# Patient Record
Sex: MALE | Race: WHITE | Employment: UNEMPLOYED | URBAN - NONMETROPOLITAN AREA
[De-identification: names, ages, dates, MRNs, and addresses within clinical notes are randomized per-mention and may not be internally consistent; named-entity substitution may affect disease eponyms.]

---

## 2022-03-24 ENCOUNTER — OFFICE VISIT (OUTPATIENT)
Dept: FAMILY MEDICINE CLINIC | Age: 37
End: 2022-03-24
Payer: MEDICAID

## 2022-03-24 VITALS
OXYGEN SATURATION: 97 % | WEIGHT: 137 LBS | RESPIRATION RATE: 18 BRPM | HEART RATE: 92 BPM | BODY MASS INDEX: 21.5 KG/M2 | HEIGHT: 67 IN | DIASTOLIC BLOOD PRESSURE: 80 MMHG | SYSTOLIC BLOOD PRESSURE: 136 MMHG | TEMPERATURE: 97.4 F

## 2022-03-24 DIAGNOSIS — M62.421 CONTRACTURE OF MUSCLE OF RIGHT UPPER ARM: ICD-10-CM

## 2022-03-24 DIAGNOSIS — F19.11 DRUG ABUSE IN REMISSION (HCC): ICD-10-CM

## 2022-03-24 DIAGNOSIS — Z86.73 HISTORY OF STROKE: ICD-10-CM

## 2022-03-24 DIAGNOSIS — F41.8 DEPRESSION WITH ANXIETY: ICD-10-CM

## 2022-03-24 DIAGNOSIS — E78.5 HYPERLIPIDEMIA, UNSPECIFIED HYPERLIPIDEMIA TYPE: ICD-10-CM

## 2022-03-24 DIAGNOSIS — I10 PRIMARY HYPERTENSION: Primary | ICD-10-CM

## 2022-03-24 PROCEDURE — G8427 DOCREV CUR MEDS BY ELIG CLIN: HCPCS | Performed by: NURSE PRACTITIONER

## 2022-03-24 PROCEDURE — G8484 FLU IMMUNIZE NO ADMIN: HCPCS | Performed by: NURSE PRACTITIONER

## 2022-03-24 PROCEDURE — G8420 CALC BMI NORM PARAMETERS: HCPCS | Performed by: NURSE PRACTITIONER

## 2022-03-24 PROCEDURE — 1036F TOBACCO NON-USER: CPT | Performed by: NURSE PRACTITIONER

## 2022-03-24 PROCEDURE — 99204 OFFICE O/P NEW MOD 45 MIN: CPT | Performed by: NURSE PRACTITIONER

## 2022-03-24 RX ORDER — FAMOTIDINE 20 MG/1
TABLET, FILM COATED ORAL
COMMUNITY
Start: 2022-03-08

## 2022-03-24 RX ORDER — DULOXETIN HYDROCHLORIDE 30 MG/1
30 CAPSULE, DELAYED RELEASE ORAL DAILY
Qty: 30 CAPSULE | Refills: 0 | Status: SHIPPED | OUTPATIENT
Start: 2022-03-24 | End: 2022-06-02

## 2022-03-24 RX ORDER — VELPATASVIR AND SOFOSBUVIR 100; 400 MG/1; MG/1
TABLET, FILM COATED ORAL
COMMUNITY
Start: 2022-03-08

## 2022-03-24 RX ORDER — METOPROLOL SUCCINATE 50 MG/1
TABLET, EXTENDED RELEASE ORAL
COMMUNITY
Start: 2022-01-25

## 2022-03-24 RX ORDER — ASPIRIN 81 MG/1
TABLET, CHEWABLE ORAL
COMMUNITY
Start: 2022-03-08 | End: 2022-07-27 | Stop reason: SDUPTHER

## 2022-03-24 RX ORDER — MIRTAZAPINE 7.5 MG/1
TABLET, FILM COATED ORAL
COMMUNITY
Start: 2022-03-08 | End: 2022-03-24

## 2022-03-24 RX ORDER — ATORVASTATIN CALCIUM 40 MG/1
TABLET, FILM COATED ORAL
COMMUNITY
Start: 2022-03-08 | End: 2022-07-27 | Stop reason: SDUPTHER

## 2022-03-24 RX ORDER — FLUTICASONE PROPIONATE 50 MCG
SPRAY, SUSPENSION (ML) NASAL
COMMUNITY
End: 2022-03-24

## 2022-03-24 RX ORDER — METHADONE HYDROCHLORIDE 5 MG/1
TABLET ORAL
COMMUNITY

## 2022-03-24 RX ORDER — ALBUTEROL SULFATE 90 UG/1
AEROSOL, METERED RESPIRATORY (INHALATION)
COMMUNITY
End: 2022-03-24

## 2022-03-24 RX ORDER — GABAPENTIN 800 MG/1
TABLET ORAL
COMMUNITY
End: 2022-03-24

## 2022-03-24 RX ORDER — MULTIVIT-MIN/IRON FUM/FOLIC AC 7.5 MG-4
1 TABLET ORAL DAILY
COMMUNITY
Start: 2022-03-08 | End: 2022-07-27 | Stop reason: SDUPTHER

## 2022-03-24 RX ORDER — MULTIVIT-MIN/IRON/FOLIC ACID/K 18-600-40
CAPSULE ORAL
COMMUNITY
Start: 2022-03-08 | End: 2022-07-27 | Stop reason: SDUPTHER

## 2022-03-24 RX ORDER — ROPINIROLE 1 MG/1
TABLET, FILM COATED ORAL
COMMUNITY
End: 2022-03-24

## 2022-03-24 RX ORDER — LOSARTAN POTASSIUM 25 MG/1
TABLET ORAL
COMMUNITY
Start: 2022-01-25 | End: 2022-06-02 | Stop reason: SDUPTHER

## 2022-03-24 RX ORDER — NALOXONE HYDROCHLORIDE 4 MG/.1ML
4 SPRAY NASAL PRN
COMMUNITY
Start: 2021-12-17 | End: 2022-03-24

## 2022-03-24 ASSESSMENT — ENCOUNTER SYMPTOMS
GASTROINTESTINAL NEGATIVE: 1
EYES NEGATIVE: 1
RESPIRATORY NEGATIVE: 1

## 2022-03-24 ASSESSMENT — PATIENT HEALTH QUESTIONNAIRE - PHQ9
SUM OF ALL RESPONSES TO PHQ QUESTIONS 1-9: 2
1. LITTLE INTEREST OR PLEASURE IN DOING THINGS: 1
SUM OF ALL RESPONSES TO PHQ QUESTIONS 1-9: 2
SUM OF ALL RESPONSES TO PHQ9 QUESTIONS 1 & 2: 2
SUM OF ALL RESPONSES TO PHQ QUESTIONS 1-9: 2
SUM OF ALL RESPONSES TO PHQ QUESTIONS 1-9: 2
2. FEELING DOWN, DEPRESSED OR HOPELESS: 1

## 2022-03-24 NOTE — PROGRESS NOTES
SUBJECTIVE:  Ana Clark is a 29 y.o. male that presents with   Chief Complaint   Patient presents with    New Patient     establish care   . HPI:    This is a very pleasant 28-year-old white male who presents to get established for primary healthcare. He has evidently had mostly specialty care secondary to problems that arose from drug use. Drug of choice was heroin. Says that he used a lot of stuff. 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 He wants me to give him some gabapentin says that he was on gabapentin when he was in the hospital but no one continue the gabapentin. He was in the hospital for endocarditis got out on Wednesday wanted to stop at the pet store on the way home when in fell and broke his hip went straight back to the hospital.  Think he was want to buy something for his daughter at the pet store. Today he tells me his biggest problem is pain. He sees specialist however and does have drug addiction so I will not give him a controlled substance and I did explain this to him and my rationale behind that. He does have hypertension hyperlipidemia some depression and anxiety and the stroke and from that then he has some muscle contractures as well. He is on medications secondary to his cardiology referral.  He tells me that his blood pressure has been good he denies chest pain or shortness of breath fever chills he has no pedal edema. Today he looks good color is good he has good blood flow and he seems to be determined not to fall back into drug abuse. Review of Systems   Constitutional: Negative. HENT: Negative. Eyes: Negative. Respiratory: Negative. Cardiovascular: Negative. Gastrointestinal: Negative. Endocrine: Negative. Genitourinary: Negative. Musculoskeletal: Positive for arthralgias, gait problem and myalgias. Muscle contracture right arm   Skin: Negative. Neurological: Positive for weakness. Hematological: Bruises/bleeds easily. Psychiatric/Behavioral: Positive for decreased concentration and dysphoric mood. The patient is nervous/anxious. All other systems reviewed and are negative. OBJECTIVE:  /80   Pulse 92   Temp 97.4 °F (36.3 °C) (Infrared)   Resp 18   Ht 5' 7\" (1.702 m)   Wt 137 lb (62.1 kg)   SpO2 97% Comment: RA  BMI 21.46 kg/m²   Physical Exam  Vitals and nursing note reviewed. Constitutional:       Appearance: Normal appearance. He is well-developed and normal weight. HENT:      Head: Normocephalic and atraumatic. Right Ear: Tympanic membrane, ear canal and external ear normal.      Left Ear: Tympanic membrane, ear canal and external ear normal.      Nose: Nose normal.      Mouth/Throat:      Mouth: Mucous membranes are moist.      Pharynx: Oropharynx is clear. Eyes:      Extraocular Movements: Extraocular movements intact. Conjunctiva/sclera: Conjunctivae normal.      Pupils: Pupils are equal, round, and reactive to light. Cardiovascular:      Rate and Rhythm: Normal rate and regular rhythm. Pulses: Normal pulses. Heart sounds: Normal heart sounds. Pulmonary:      Effort: Pulmonary effort is normal.      Breath sounds: Normal breath sounds. Abdominal:      General: Abdomen is flat. Bowel sounds are normal.      Palpations: Abdomen is soft. Musculoskeletal:         General: Normal range of motion. Right forearm: Deformity present. Cervical back: Normal range of motion and neck supple. Lumbar back: Tenderness present. Skin:     General: Skin is warm and dry. Capillary Refill: Capillary refill takes less than 2 seconds. Neurological:      Mental Status: He is alert and oriented to person, place, and time.       Deep Tendon Reflexes: Reflexes are normal and symmetric. Psychiatric:         Behavior: Behavior normal.         Thought Content: Thought content normal.         Judgment: Judgment normal.       No results found for requested labs within last 30 days. No results found for: LABA1C, LABMICR, LDLCALC      No results found for: WBC, NEUTROABS, HGB, HCT, MCV, PLT    No results found for: TSH      ASSESSMENT/PLAN:   Diagnosis Orders   1. Primary hypertension     2. Hyperlipidemia, unspecified hyperlipidemia type  Lipid Panel   3. Depression with anxiety  Comprehensive Metabolic Panel    TSH   4. History of stroke  External Referral To Physical Therapy   5. Drug abuse in remission ([facility] [address]     6. Contracture of muscle of right upper arm  External Referral To Physical Therapy           Orders Placed This Encounter   Procedures    Lipid Panel     Standing Status:   Future     Standing Expiration Date:   3/24/2023     Order Specific Question:   Is Patient Fasting?/# of Hours     Answer:   6    Comprehensive Metabolic Panel     Standing Status:   Future     Standing Expiration Date:   3/24/2023    TSH     Standing Status:   Future     Standing Expiration Date:   3/24/2023    External Referral To Physical Therapy     Referral Priority:   Routine     Referral Type:   Eval and Treat     Referral Reason:   Specialty Services Required     Requested Specialty:   Physical Therapy     Number of Visits Requested:   1        Outpatient Encounter Medications as of 3/24/2022   Medication Sig Dispense Refill    methadone (DOLOPHINE) 5 MG tablet methadone 5 mg tablet   Take 1 tablet 4 times a day by oral route as directed for 30 days.       Sofosbuvir-Velpatasvir 400-100 MG TABS       famotidine (PEPCID) 20 MG tablet       atorvastatin (LIPITOR) 40 MG tablet       losartan (COZAAR) 25 MG tablet       Multiple Vitamins-Minerals (MULTIVITAMIN WITH MINERALS) tablet Take 1 tablet by mouth daily      D3-1000 25 MCG (1000 UT) TABS tablet  aspirin 81 MG chewable tablet       metoprolol succinate (TOPROL XL) 50 MG extended release tablet       DULoxetine (CYMBALTA) 30 MG extended release capsule Take 1 capsule by mouth daily 30 capsule 0    [DISCONTINUED] gabapentin (NEURONTIN) 800 MG tablet gabapentin 800 mg tablet   Take 1 tablet 3 times a day by oral route as directed for 30 days. (Patient not taking: Reported on 3/24/2022)      [DISCONTINUED] albuterol sulfate HFA (VENTOLIN HFA) 108 (90 Base) MCG/ACT inhaler Ventolin HFA 90 mcg/actuation aerosol inhaler (Patient not taking: Reported on 3/24/2022)      [DISCONTINUED] fluticasone (FLONASE) 50 MCG/ACT nasal spray fluticasone propionate 50 mcg/actuation nasal spray,suspension      [DISCONTINUED] rOPINIRole (REQUIP) 1 MG tablet ropinirole 1 mg tablet      [DISCONTINUED] naloxone 4 MG/0.1ML LIQD nasal spray 4 mg by Nasal route as needed      [DISCONTINUED] mirtazapine (REMERON) 7.5 MG tablet        No facility-administered encounter medications on file as of 3/24/2022. Return in about 4 weeks (around 4/21/2022), or if symptoms worsen or fail to improve. PATIENT COUNSELING    Counseling was provided today regardingthe following topics: Healthy eating habits, Regular exercise, substance abuse and healthy sleep habits. Discussed use, benefit, and side effectsof prescribed medications. Barriers to medication compliance addressed. All patient questions answered. Pt voiced understanding.

## 2022-03-28 ENCOUNTER — HOSPITAL ENCOUNTER (OUTPATIENT)
Facility: HOSPITAL | Age: 37
Discharge: HOME | End: 2022-03-28
Payer: MEDICAID

## 2022-03-28 DIAGNOSIS — F41.8 DEPRESSION WITH ANXIETY: ICD-10-CM

## 2022-03-28 DIAGNOSIS — E78.5 HYPERLIPIDEMIA, UNSPECIFIED HYPERLIPIDEMIA TYPE: ICD-10-CM

## 2022-03-28 LAB
A/G RATIO: 1.7 (ref 0.8–2)
ALBUMIN SERPL-MCNC: 5 G/DL (ref 3.4–4.8)
ALP BLD-CCNC: 124 U/L (ref 25–100)
ALT SERPL-CCNC: 22 U/L (ref 4–36)
ANION GAP SERPL CALCULATED.3IONS-SCNC: 14 MMOL/L (ref 3–16)
AST SERPL-CCNC: 20 U/L (ref 8–33)
BILIRUB SERPL-MCNC: 1.1 MG/DL (ref 0.3–1.2)
BUN BLDV-MCNC: 12 MG/DL (ref 6–20)
CALCIUM SERPL-MCNC: 9.9 MG/DL (ref 8.5–10.5)
CHLORIDE BLD-SCNC: 101 MMOL/L (ref 98–107)
CHOLESTEROL, TOTAL: 136 MG/DL (ref 0–200)
CO2: 24 MMOL/L (ref 20–30)
CREAT SERPL-MCNC: 0.7 MG/DL (ref 0.4–1.2)
GFR AFRICAN AMERICAN: >59
GFR NON-AFRICAN AMERICAN: >59
GLOBULIN: 2.9 G/DL
GLUCOSE BLD-MCNC: 105 MG/DL (ref 74–106)
HCT VFR BLD CALC: 43.7 % (ref 40–54)
HDLC SERPL-MCNC: 58 MG/DL (ref 40–60)
HEMOGLOBIN: 14.1 G/DL (ref 13–18)
INR BLD: 1.07 (ref 0.88–1.11)
LDL CHOLESTEROL CALCULATED: 52 MG/DL
MCH RBC QN AUTO: 26.9 PG (ref 27–32)
MCHC RBC AUTO-ENTMCNC: 32.3 G/DL (ref 31–35)
MCV RBC AUTO: 83.4 FL (ref 80–100)
PDW BLD-RTO: 15.4 % (ref 11–16)
PLATELET # BLD: 323 K/UL (ref 150–400)
PMV BLD AUTO: 9 FL (ref 6–10)
POTASSIUM SERPL-SCNC: 4.2 MMOL/L (ref 3.4–5.1)
PROTHROMBIN TIME: 13.4 SEC (ref 11.6–13.8)
RBC # BLD: 5.24 M/UL (ref 4.5–6)
SODIUM BLD-SCNC: 139 MMOL/L (ref 136–145)
TOTAL PROTEIN: 7.9 G/DL (ref 6.4–8.3)
TRIGL SERPL-MCNC: 130 MG/DL (ref 0–249)
TSH SERPL DL<=0.05 MIU/L-ACNC: 3.49 UIU/ML (ref 0.27–4.2)
VLDLC SERPL CALC-MCNC: 26 MG/DL
WBC # BLD: 6.5 K/UL (ref 4–11)

## 2022-03-28 PROCEDURE — 80061 LIPID PANEL: CPT

## 2022-03-28 PROCEDURE — 85610 PROTHROMBIN TIME: CPT

## 2022-03-28 PROCEDURE — 80053 COMPREHEN METABOLIC PANEL: CPT

## 2022-03-28 PROCEDURE — 87522 HEPATITIS C REVRS TRNSCRPJ: CPT

## 2022-03-28 PROCEDURE — 85027 COMPLETE CBC AUTOMATED: CPT

## 2022-03-28 PROCEDURE — 84443 ASSAY THYROID STIM HORMONE: CPT

## 2022-03-31 LAB
HCV QNT BY NAAT IU/ML: NOT DETECTED IU/ML
HCV QNT BY NAAT LOG IU/ML: NOT DETECTED LOG IU/ML
INTERPRETATION: NOT DETECTED

## 2022-06-02 RX ORDER — DULOXETIN HYDROCHLORIDE 60 MG/1
60 CAPSULE, DELAYED RELEASE ORAL DAILY
Qty: 30 CAPSULE | Refills: 5 | Status: SHIPPED | OUTPATIENT
Start: 2022-06-02

## 2022-06-02 RX ORDER — LOSARTAN POTASSIUM 25 MG/1
25 TABLET ORAL DAILY
Qty: 30 TABLET | Refills: 5 | Status: SHIPPED | OUTPATIENT
Start: 2022-06-02

## 2022-07-20 ENCOUNTER — TELEPHONE (OUTPATIENT)
Dept: FAMILY MEDICINE CLINIC | Age: 37
End: 2022-07-20

## 2022-07-20 NOTE — TELEPHONE ENCOUNTER
Herman Morales called and was wanting your input on [employer] IV-C filter. They were wondering what your thoughts were on removing it or him keeping it.  Justo Aguayo stated that you can call her at [phone] and discuss it if you would like,

## 2022-07-27 ENCOUNTER — TELEPHONE (OUTPATIENT)
Dept: FAMILY MEDICINE CLINIC | Age: 37
End: 2022-07-27

## 2022-07-27 RX ORDER — ASPIRIN 81 MG/1
81 TABLET, CHEWABLE ORAL DAILY
Qty: 30 TABLET | Refills: 0 | Status: SHIPPED | OUTPATIENT
Start: 2022-07-27

## 2022-07-27 RX ORDER — MULTIVIT-MIN/IRON/FOLIC ACID/K 18-600-40
1000 CAPSULE ORAL DAILY
Qty: 30 TABLET | Refills: 0 | Status: SHIPPED | OUTPATIENT
Start: 2022-07-27

## 2022-07-27 RX ORDER — ATORVASTATIN CALCIUM 40 MG/1
40 TABLET, FILM COATED ORAL DAILY
Qty: 30 TABLET | Refills: 0 | Status: SHIPPED | OUTPATIENT
Start: 2022-07-27

## 2022-07-27 RX ORDER — MULTIVIT-MIN/IRON FUM/FOLIC AC 7.5 MG-4
1 TABLET ORAL DAILY
Qty: 30 TABLET | Refills: 0 | Status: SHIPPED | OUTPATIENT
Start: 2022-07-27

## 2022-07-27 NOTE — TELEPHONE ENCOUNTER
----- Message from Daily Galeas LPN sent at 4/96/2319  2:31 PM EDT -----  Subject: Refill Request    QUESTIONS  Name of Medication? Multiple Vitamins-Minerals (MULTIVITAMIN WITH   MINERALS) tablet  Patient-reported dosage and instructions? 1 daily  How many days do you have left? 1  Preferred Pharmacy? [address] phone number (if available)? [phone]  ---------------------------------------------------------------------------  --------------,  Name of Medication? atorvastatin (LIPITOR) 40 MG tablet  Patient-reported dosage and instructions? 1 daily  How many days do you have left? 1  Preferred Pharmacy? [address] phone number (if available)? [phone]  ---------------------------------------------------------------------------  --------------,  Name of Medication? aspirin 81 MG chewable tablet  Patient-reported dosage and instructions? 1 daily  How many days do you have left? 0  Preferred Pharmacy? [address] phone number (if available)? [phone]  ---------------------------------------------------------------------------  --------------,  Name of Medication? Other - cetrinzine 10 mg  Patient-reported dosage and instructions? 1 daily  How many days do you have left? 0  Preferred Pharmacy? [address] phone number (if available)? [phone]  ---------------------------------------------------------------------------  --------------,  Name of Medication? D3-1000 25 MCG (1000 UT) TABS tablet  Patient-reported dosage and instructions? 1 daily  How many days do you have left? 0  Preferred Pharmacy? [address] phone number (if available)? [phone]  ---------------------------------------------------------------------------  --------------  CALL BACK INFO  What is the best way for the office to contact you? OK to leave message on   voicemail  Preferred Call Back Phone Number? [phone]  ---------------------------------------------------------------------------  --------------  SCRIPT ANSWERS  Relationship to Patient?  Self

## 2022-07-29 ENCOUNTER — TELEPHONE (OUTPATIENT)
Dept: FAMILY MEDICINE CLINIC | Age: 37
End: 2022-07-29

## 2022-07-29 NOTE — TELEPHONE ENCOUNTER
----- Message from Franklyn Harrington LPN sent at 6/42/4385  2:31 PM EDT -----  Subject: Refill Request    QUESTIONS  Name of Medication? Multiple Vitamins-Minerals (MULTIVITAMIN WITH   MINERALS) tablet  Patient-reported dosage and instructions? 1 daily  How many days do you have left? 1  Preferred Pharmacy? [address] phone number (if available)? [phone]  ---------------------------------------------------------------------------  --------------,  Name of Medication? atorvastatin (LIPITOR) 40 MG tablet  Patient-reported dosage and instructions? 1 daily  How many days do you have left? 1  Preferred Pharmacy? [address] phone number (if available)? [phone]  ---------------------------------------------------------------------------  --------------,  Name of Medication? aspirin 81 MG chewable tablet  Patient-reported dosage and instructions? 1 daily  How many days do you have left? 0  Preferred Pharmacy? [address] phone number (if available)? [phone]  ---------------------------------------------------------------------------  --------------,  Name of Medication? Other - cetrinzine 10 mg  Patient-reported dosage and instructions? 1 daily  How many days do you have left? 0  Preferred Pharmacy? [address] phone number (if available)? [phone]  ---------------------------------------------------------------------------  --------------,  Name of Medication? D3-1000 25 MCG (1000 UT) TABS tablet  Patient-reported dosage and instructions? 1 daily  How many days do you have left? 0  Preferred Pharmacy? [address] phone number (if available)? [phone]  ---------------------------------------------------------------------------  --------------  CALL BACK INFO  What is the best way for the office to contact you? OK to leave message on   voicemail  Preferred Call Back Phone Number?

## 2024-12-18 NOTE — PATIENT INSTRUCTIONS
or into a sealable bag. Cover up or remove any of your personal information on the empty containers by covering it with black permanent marker or duct tape. Place the sealed container with the mixture, and the empty drug containers, in the trash. · If you use a medication that is in the form of a patch, dispose of used patches by folding them in half so that the sticky sides meet, and then flushing them down a toilet. They should not be placed in the household trash where children or pets can find them. · If you have any questions, ask your provider or pharmacist for more information. · Be sure to keep all appointments for provider visits or tests. We are committed to providing you with the best care possible. In order to help us achieve these goals please remember to bring all medications, herbal products, and over the counter supplements with you to each visit. If your provider has ordered testing for you, please be sure to follow up with our office if you have not received results within 7 days after the testing took place. *If you receive a survey after visiting one of our offices, please take time to share your experience concerning your physician office visit. These surveys are confidential and no health information about you is shared. We are eager to improve for you and we are counting on your feedback to help make that happen. [id number]